# Patient Record
Sex: FEMALE | Race: WHITE
[De-identification: names, ages, dates, MRNs, and addresses within clinical notes are randomized per-mention and may not be internally consistent; named-entity substitution may affect disease eponyms.]

---

## 2017-07-20 ENCOUNTER — HOSPITAL ENCOUNTER (OUTPATIENT)
Dept: HOSPITAL 45 - C.MAMM | Age: 67
Discharge: HOME | End: 2017-07-20
Payer: COMMERCIAL

## 2017-07-20 DIAGNOSIS — Z12.31: Primary | ICD-10-CM

## 2017-07-20 NOTE — MAMMOGRAPHY REPORT
BILATERAL DIGITAL SCREENING MAMMOGRAM TOMOSYNTHESIS WITH CAD: 7/20/2017

CLINICAL HISTORY: Routine screening.  Patient has no complaints.  





TECHNIQUE:  Breast tomosynthesis in addition to standard 2D mammography was performed. Current study 
was also evaluated with a Computer Aided Detection (CAD) system.  



COMPARISON: Comparison is made to exams dated:  7/18/2016 mammogram, 7/15/2015 mammogram, 7/9/2014 ma
mmogram, 6/26/2013 mammogram, 6/25/2012 mammogram, and 6/22/2011 mammogram - [facility]
nter.   



BREAST COMPOSITION:  The tissue of both breasts is heterogeneously dense, which may obscure small mas
ses.  



FINDINGS:  There are stable asymmetries bilaterally.  Diffuse bilateral benign-appearing rodlike and 
coarse calcifications.  No new suspicious mass, architectural distortion or cluster of microcalcifica
tions is seen.  



IMPRESSION:  ACR BI-RADS CATEGORY 1: NEGATIVE

There is no mammographic evidence of malignancy. A 1 year screening mammogram is recommended.  The pa
tient will receive written notification of the results.  





Approximately 10% of breast cancers are not detected with mammography. A negative mammographic report
 should not delay biopsy if a clinically suggestive mass is present.



Rosmery Werner M.D.          

ay/:7/20/2017 15:25:19  



Imaging Technologist: Katelyn RAMIREZ)(JOANN), [facility]

letter sent: Normal 1/2  

BI-RADS Code: ACR BI-RADS Category 1: Negative

## 2017-12-11 ENCOUNTER — HOSPITAL ENCOUNTER (OUTPATIENT)
Dept: HOSPITAL 45 - C.RDSM | Age: 67
Discharge: HOME | End: 2017-12-11
Attending: ORTHOPAEDIC SURGERY
Payer: COMMERCIAL

## 2017-12-11 DIAGNOSIS — M25.512: ICD-10-CM

## 2017-12-11 DIAGNOSIS — M25.511: Primary | ICD-10-CM

## 2017-12-15 ENCOUNTER — HOSPITAL ENCOUNTER (OUTPATIENT)
Dept: HOSPITAL 45 - C.MRI | Age: 67
Discharge: HOME | End: 2017-12-15
Attending: ORTHOPAEDIC SURGERY
Payer: COMMERCIAL

## 2017-12-15 DIAGNOSIS — M75.101: Primary | ICD-10-CM

## 2017-12-15 NOTE — DIAGNOSTIC IMAGING REPORT
R UPPER EXT JOINT WITHOUT



CLINICAL HISTORY: 67 years-old Female with R SHOULDER PAIN.  Chronic right

shoulder pain



COMPARISON: Right shoulder radiographs 12/11/2017.



TECHNIQUE: Multiplanar, multi sequence MRI of the right shoulder was performed

without intravenous contrast. 



FINDINGS: 



ROTATOR CUFF:  Full-thickness tear of the anterior and mid fibers of the

supraspinatus tendon measures 2.3 x 2.5 cm in transverse and AP dimension nicely

seen on image 11 series 6 and image 6 series 10. Tendon is retracted to the

level of the mid humeral head. There are some mid and posterior fibers of the

supraspinatus tendon which remain intact. There is mild atrophy of the

supraspinatus musculature.



Mild to moderate tendinosis of the infraspinatus tendon with low-grade

interstitial tearing of the insertional fibers as seen on image 8 series 6. The

subscapularis tendon is intact with moderate tendinosis. Teres minor is intact. 



BICEPS TENDON:  The long-head biceps tendon is intact. Mild intra-articular long

head biceps tendinosis.  The biceps pulley and anchor are intact.



LABRUM:  There is tearing of the superior labrum within both the anterior and

posterior quadrants without associated edema. Mild fraying of the inferior

labrum. No displaced fragment or parameniscal cyst identified.



GLENOHUMERAL JOINT:  Mild glenohumeral joint space narrowing with marginal

spurring and chondral thinning. Small glenohumeral joint effusion.  There is no

loose body or debris present within the glenohumeral joint.



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  No

evidence of os acromiale. Moderate subacromial/subdeltoid bursitis.



OUTLET SPACES:  The suprascapular notch and quadrilateral space are without

obstructing or space occupying lesions.



BONE MARROW:  Multifocal subcortical cystic changes of the humeral head. No

acute fracture, dislocation or significant marrow edema. No marrow replacing

process.



SOFT TISSUES:  The periarticular soft tissues are unremarkable.



IMPRESSION:  

1. 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

small joint effusion and moderate subacromial/subdeltoid bursitis.

2. Full-thickness tear of the anterior and mid fibers of the supraspinatus

tendon measuring up to 2.5 cm with tendon retraction to the level of the mid

humeral head. Mild associated atrophy of the supraspinatus musculature.

3. Mild to moderate tendinosis of the infraspinatus tendon with low-grade

interstitial tearing.

4. Likely chronic labral tears as above.

5. Long head biceps tendon appears intact.







The above report was generated using voice recognition software. It may contain

grammatical, syntax or spelling errors.







Electronically signed by:  Yuriy Salcedo M.D.

12/15/2017 8:47 AM



Dictated Date/Time:  12/15/2017 8:37 AM

## 2018-08-09 ENCOUNTER — HOSPITAL ENCOUNTER (OUTPATIENT)
Dept: HOSPITAL 45 - C.MAMM | Age: 68
Discharge: HOME | End: 2018-08-09
Payer: COMMERCIAL

## 2018-08-09 DIAGNOSIS — Z12.31: Primary | ICD-10-CM

## 2018-08-10 NOTE — MAMMOGRAPHY REPORT
BILATERAL DIGITAL SCREENING MAMMOGRAM TOMOSYNTHESIS WITH CAD: 8/9/2018

CLINICAL HISTORY: Routine screening. Patient has no complaints.  





TECHNIQUE: The study was acquired using full field digital technology and interpreted from soft copy.
 Breast tomosynthesis in addition to standard 2D mammography was performed. Current study was also ev
aluated with a Computer Aided Detection (CAD) system.  



COMPARISON: Comparison is made to exams dated:  7/20/2017 mammogram, 7/18/2016 mammogram, 7/15/2015 m
ammogram, 7/9/2014 mammogram, 6/26/2013 mammogram, and 6/25/2012 mammogram - [facility]
nter.   

BREAST COMPOSITION: The tissue of both breasts is heterogeneously dense, which may obscure small mass
es.  



FINDINGS: 

No suspicious masses, calcifications, or areas of architectural distortion are noted in either breast
. There has been no significant interval change compared to prior exams.  Scattered bilateral benign-
appearing calcifications are again noted.



IMPRESSION: ACR BI-RADS CATEGORY 2: BENIGN

There is no mammographic evidence of malignancy. A 1 year screening mammogram is recommended.(08/10/2
019)  The patient will receive written notification of the results.  





Some breast cancers are not detected with mammography. A negative mammographic report should not felipa
y biopsy if a clinically suggestive mass is present.



Anita Gregorio M.D.          

/:8/9/2018 16:02:18  



Imaging Technologist: RT Joseph(R)(M), [facility]

letter sent: Normal 1/2  

BI-RADS Code: ACR BI-RADS Category 2: Benign